# Patient Record
Sex: MALE | Race: WHITE
[De-identification: names, ages, dates, MRNs, and addresses within clinical notes are randomized per-mention and may not be internally consistent; named-entity substitution may affect disease eponyms.]

---

## 2018-02-08 ENCOUNTER — HOSPITAL ENCOUNTER (OUTPATIENT)
Dept: HOSPITAL 76 - SDS | Age: 53
Discharge: HOME | End: 2018-02-08
Attending: SURGERY
Payer: COMMERCIAL

## 2018-02-08 VITALS — SYSTOLIC BLOOD PRESSURE: 125 MMHG | DIASTOLIC BLOOD PRESSURE: 80 MMHG

## 2018-02-08 DIAGNOSIS — Z12.11: Primary | ICD-10-CM

## 2018-02-08 DIAGNOSIS — K64.8: ICD-10-CM

## 2018-02-08 DIAGNOSIS — K64.4: ICD-10-CM

## 2018-02-08 PROCEDURE — 0DJD8ZZ INSPECTION OF LOWER INTESTINAL TRACT, VIA NATURAL OR ARTIFICIAL OPENING ENDOSCOPIC: ICD-10-PCS | Performed by: SURGERY

## 2018-02-08 PROCEDURE — 45378 DIAGNOSTIC COLONOSCOPY: CPT

## 2021-01-21 ENCOUNTER — HOSPITAL ENCOUNTER (OUTPATIENT)
Dept: HOSPITAL 76 - LAB.N | Age: 56
Discharge: HOME | End: 2021-01-21
Attending: FAMILY MEDICINE
Payer: COMMERCIAL

## 2021-01-21 ENCOUNTER — HOSPITAL ENCOUNTER (OUTPATIENT)
Dept: HOSPITAL 76 - DI.N | Age: 56
Discharge: HOME | End: 2021-01-21
Attending: FAMILY MEDICINE
Payer: COMMERCIAL

## 2021-01-21 DIAGNOSIS — I10: Primary | ICD-10-CM

## 2021-01-21 LAB
ALBUMIN DIAFP-MCNC: 4.3 G/DL (ref 3.2–5.5)
ALBUMIN/GLOB SERPL: 1.5 {RATIO} (ref 1–2.2)
ALP SERPL-CCNC: 100 IU/L (ref 42–121)
ALT SERPL W P-5'-P-CCNC: 16 IU/L (ref 10–60)
ANION GAP SERPL CALCULATED.4IONS-SCNC: 5 MMOL/L (ref 6–13)
AST SERPL W P-5'-P-CCNC: 22 IU/L (ref 10–42)
BASOPHILS NFR BLD AUTO: 0 10^3/UL (ref 0–0.1)
BASOPHILS NFR BLD AUTO: 0.7 %
BILIRUB BLD-MCNC: 0.8 MG/DL (ref 0.2–1)
BUN SERPL-MCNC: 15 MG/DL (ref 6–20)
CALCIUM UR-MCNC: 9.4 MG/DL (ref 8.5–10.3)
CHLORIDE SERPL-SCNC: 105 MMOL/L (ref 101–111)
CHOLEST SERPL-MCNC: 212 MG/DL
CO2 SERPL-SCNC: 28 MMOL/L (ref 21–32)
CREAT SERPLBLD-SCNC: 1.1 MG/DL (ref 0.6–1.2)
EOSINOPHIL # BLD AUTO: 0.2 10^3/UL (ref 0–0.7)
EOSINOPHIL NFR BLD AUTO: 2.8 %
ERYTHROCYTE [DISTWIDTH] IN BLOOD BY AUTOMATED COUNT: 11.9 % (ref 12–15)
GLOBULIN SER-MCNC: 2.9 G/DL (ref 2.1–4.2)
GLUCOSE SERPL-MCNC: 101 MG/DL (ref 70–100)
HDLC SERPL-MCNC: 41 MG/DL
HDLC SERPL: 5.2 {RATIO} (ref ?–5)
HGB UR QL STRIP: 15.5 G/DL (ref 14–18)
LDLC SERPL CALC-MCNC: 127 MG/DL
LDLC/HDLC SERPL: 3.1 {RATIO} (ref ?–3.6)
LYMPHOCYTES # SPEC AUTO: 1.9 10^3/UL (ref 1.5–3.5)
LYMPHOCYTES NFR BLD AUTO: 34.4 %
MCH RBC QN AUTO: 29 PG (ref 27–31)
MCHC RBC AUTO-ENTMCNC: 33.3 G/DL (ref 32–36)
MCV RBC AUTO: 87.3 FL (ref 80–94)
MONOCYTES # BLD AUTO: 0.5 10^3/UL (ref 0–1)
MONOCYTES NFR BLD AUTO: 9.4 %
NEUTROPHILS # BLD AUTO: 2.9 10^3/UL (ref 1.5–6.6)
NEUTROPHILS # SNV AUTO: 5.4 X10^3/UL (ref 4.8–10.8)
NEUTROPHILS NFR BLD AUTO: 52.5 %
PDW BLD AUTO: 9.7 FL (ref 7.4–11.4)
PLATELET # BLD: 247 10^3/UL (ref 130–450)
PROT SPEC-MCNC: 7.2 G/DL (ref 6.7–8.2)
RBC MAR: 5.34 10^6/UL (ref 4.7–6.1)
T3FREE SERPL-MCNC: 3.16 PG/ML (ref 2.5–3.9)
T4 FREE SERPL-MCNC: 0.95 NG/DL (ref 0.58–1.64)
TSH SERPL-ACNC: 2.7 UIU/ML (ref 0.34–5.6)
VLDLC SERPL-SCNC: 44 MG/DL

## 2021-01-21 PROCEDURE — 80061 LIPID PANEL: CPT

## 2021-01-21 PROCEDURE — 83721 ASSAY OF BLOOD LIPOPROTEIN: CPT

## 2021-01-21 PROCEDURE — 85025 COMPLETE CBC W/AUTO DIFF WBC: CPT

## 2021-01-21 PROCEDURE — 84481 FREE ASSAY (FT-3): CPT

## 2021-01-21 PROCEDURE — 36415 COLL VENOUS BLD VENIPUNCTURE: CPT

## 2021-01-21 PROCEDURE — 80053 COMPREHEN METABOLIC PANEL: CPT

## 2021-01-21 PROCEDURE — 84443 ASSAY THYROID STIM HORMONE: CPT

## 2021-01-21 PROCEDURE — 84439 ASSAY OF FREE THYROXINE: CPT

## 2021-01-21 NOTE — XRAY REPORT
PROCEDURE:  Chest 2 View X-Ray

 

INDICATIONS:  ESSENTIAL HYPERTENSION

 

TECHNIQUE:  2 view(s) of the chest.  

 

COMPARISON:  None.

 

FINDINGS:  

 

Surgical changes and devices:  None.  

 

Lungs and pleura:  No pleural effusions or pneumothorax.  Lungs are clear.  

 

Mediastinum:  Mediastinal contours are normal.  Heart size is normal.  

 

Bones and chest wall:  No suspicious bony abnormalities.  Soft tissues appear unremarkable.  

 

IMPRESSION:  Chest without acute cardiopulmonary abnormalities.

 

Reviewed by: Nando Joseph MD on 1/21/2021 9:17 AM PST

Approved by: Nando Joseph MD on 1/21/2021 9:17 AM [facility]

 

 

Station ID:  [id number]

## 2021-02-12 ENCOUNTER — HOSPITAL ENCOUNTER (OUTPATIENT)
Dept: HOSPITAL 76 - DI | Age: 56
Discharge: HOME | End: 2021-02-12
Attending: FAMILY MEDICINE
Payer: COMMERCIAL

## 2021-02-12 DIAGNOSIS — I47.1: Primary | ICD-10-CM

## 2021-02-12 DIAGNOSIS — I10: ICD-10-CM

## 2021-02-12 PROCEDURE — 93306 TTE W/DOPPLER COMPLETE: CPT

## 2021-03-17 ENCOUNTER — HOSPITAL ENCOUNTER (OUTPATIENT)
Dept: HOSPITAL 76 - SC | Age: 56
Discharge: HOME | End: 2021-03-17
Attending: NURSE PRACTITIONER
Payer: COMMERCIAL

## 2021-03-17 VITALS — SYSTOLIC BLOOD PRESSURE: 134 MMHG | DIASTOLIC BLOOD PRESSURE: 85 MMHG

## 2021-03-17 DIAGNOSIS — E66.3: ICD-10-CM

## 2021-03-17 DIAGNOSIS — G47.8: Primary | ICD-10-CM

## 2021-03-17 DIAGNOSIS — R51.9: ICD-10-CM

## 2021-03-17 DIAGNOSIS — R06.83: ICD-10-CM

## 2021-03-17 PROCEDURE — 99203 OFFICE O/P NEW LOW 30 MIN: CPT

## 2021-03-17 PROCEDURE — 99212 OFFICE O/P EST SF 10 MIN: CPT

## 2021-03-17 NOTE — SLEEP CARE CONSULTATION
Information from patient questionnaire entered by Crys Doan.





I have reviewed and concur with the information entered by Crys Doan. This 

document represents the service I personally performed and the decisions made by

me, Brianda Abel ARNP.





History of Present Illness


Service Date and Time: 03/17/2021    1426


Reason for Visit: New patient


Chief Complaint: reports: Unrefreshed sleep (especially when waking up and not 

able to go back to sleep due to ruminating thoughts), Snoring (only if on his 

back), Frequent awakenings at night, Other (Cardiologist).  denies: Observed 

pauses in breathing


Usual bedtime: 9:30


Time it takes to fall asleep: 15 min - 30 min


Snores at night: Yes


Sleeps alone due to snoring: No


Number of times waking at night: 1 to 2


Reasons for waking at night: reports: Bathroom


Toss, Turn, or Twitch while sleeping: No


Recalls having dreams: Yes


Usually gets out of bed at: 4:30 AM


Feels refreshed in the morning: Yes (most mornings; sometimes not rested)


Morning headache: Yes (sometimes; 2-3 times a week before BP medications)


Sleepy or fatigued during the day: No


Ever fallen asleep while driving: No


Takes day naps: No


Dreams during day naps: No


Prior sleep studies: No


Additional HPI information: 





I had the pleasure of seeing RUT GRADY today regarding the possibility of him

having a sleep disorder. His current complaints are snoring, unrefreshed sleep 

sometimes and frequent night awakenings. He has been having short durations of 

heart racing with some elevated blood pressure. He saw his PCP who put him on 

blood pressure medication. He was then sent for chest xray, an echo cardiogram 

and also a heart monitor. After the heart monitor, he was then referred here for

evaluation for possible sleep apnea. He states his wife will touch him to turn 

off his back if he is snoring. He does not snore as much on his back. She has 

not told him he has any pauses in breathing while sleeping. He wakes up 

frequently at night. He has also had morning headaches that have improved since 

starting the amlodipine and lisinopril. He has not yet followed up with the 

cardiologist. 








- Parasomnia Symptoms


Ever been unable to move upon waking from sleep: No


Walks in sleep: No


Talks in sleep: No


Ever acted out dreams in sleep: No


Ever felt weak in the knees when startled or emotional: No


Bothered by creepy, crawly, restless sensations in legs: No


Problems with memory or concentration: No





Subjective


Initial [place] Sleepiness Scale score: 6 (in 2021)





Past Medical History


Past Medical History: reports: Hypertension





Social History


The patient's occupation is an . Patient is  and lives in

[place]. 





Have you smoked in the past 12 months: No


Alcohol use: Yes


Alcohol amount and frequency: Very little, 1-2 times monthly


Caffeine use: No


Caffeine amount and frequency: Did not for 1.5 months





Family History


Family history of sleep disordered breathing: No





Allergies and Home Medications


Drug allergies reviewed: Yes (NKDA)


Home medication list reviewed: Yes


Allergy and home medication list: 





Lisinopril


Amlodipine





Review of Systems


Cardiovascular: reports: high blood pressure


Gastrointestinal: denies: heartburn


Neurological: reports: headaches


Psychiatric: denies: anxiety, depression


Ear/Nose/Throat: reports: other (nose runs a lot).  denies: sinus problems, dry 

mouth/throat, tonsillectomy, wisdom teeth removed


Musculoskeletal: reports: back pain (sometimes)


Immunologic: reports: sneezing (runny nose)





Physical Exam


Blood Pressure: 134/85


Cuff size: wrist


Heart Rate: 87


O2 Saturation: 98


Height: 6 ft


Weight: 207 lb


Body Mass Index: 28.0


BMI Classification: Overweight


Neck circumference: 16.75 (inches)


Nostrils: patent to airflow


Mouth and throat: narrow oropharynx


Soft palate: long


Hard palate: normal


Uvula: normal


Uvula visualization: 100% Mallampati Class I


Tongue: normal in size


Tonsils: 1+


Neck: normal w/o lymphadenopathy or thyromegaly


Heart: regular rate and rhythm


Lungs: clear bilaterally





Impression and Plan





1. Suspected Obstructive Sleep Apnea-Hypopnea Syndrome, as suggested by a 

history of loud and irregular snoring, morning headache, frequent awakening 

during the night, and unrefreshed sleep. Narrow oropharynx and obesity are 

common predisposing factors for obstructive sleep apnea-hypopnea syndrome. I 

recommend proceeding to polysomnography to confirm the diagnosis and to assess 

severity. If the patient has significant sleep disordered breathing, a manual 

CPAP titration study will also be performed to find the optimal treatment 

pressure. I informed the patient of what the sleep studies involve and after 

some discussion, obtained agreement to proceed. The pathophysiology of 

obstructive sleep apnea-hypopnea syndrome was discussed with the patient and 

health risks of cardiovascular and cerebrovascular disease if not treated. Risks

of drowsy driving discussed in detail and patient advised to avoid long distance

driving and to pull over at the first sign of drowsiness. Patient agreed to 

plan. 





* Schedule polysomnography +- manual CPAP titration study and return in 1-2 

  weeks after the study to discuss result and initiate therapy.


* Avoid long distance driving or driving when feeling sleepy.


* Avoid alcohol, sedative and muscle relaxant around bedtime.


* Attempt to lose weight.


* Review instructions provided by trained office staff on how to prepare for the

  sleep study.


* Return for follow-up after sleep study completed.








Counseling Topics: Weight loss health impact


Visit Type: In Office


Time Spent with Patient (minutes): 33


Provider Statement: I spent 100% of the Face to Face Visit with the patient with

greater than 50% spent counseling the patient and coordination of care.

## 2021-03-25 ENCOUNTER — HOSPITAL ENCOUNTER (OUTPATIENT)
Dept: HOSPITAL 76 - SC | Age: 56
Discharge: HOME | End: 2021-03-25
Attending: NURSE PRACTITIONER
Payer: COMMERCIAL

## 2021-03-25 DIAGNOSIS — E66.3: ICD-10-CM

## 2021-03-25 DIAGNOSIS — G47.33: Primary | ICD-10-CM

## 2021-03-25 PROCEDURE — 95806 SLEEP STUDY UNATT&RESP EFFT: CPT

## 2021-03-30 ENCOUNTER — HOSPITAL ENCOUNTER (OUTPATIENT)
Dept: HOSPITAL 76 - SC | Age: 56
Discharge: HOME | End: 2021-03-30
Attending: NURSE PRACTITIONER
Payer: COMMERCIAL

## 2021-03-30 DIAGNOSIS — E66.3: ICD-10-CM

## 2021-03-30 DIAGNOSIS — G47.33: Primary | ICD-10-CM

## 2021-03-30 PROCEDURE — 99212 OFFICE O/P EST SF 10 MIN: CPT

## 2021-03-30 PROCEDURE — 99213 OFFICE O/P EST LOW 20 MIN: CPT

## 2021-03-30 NOTE — SLEEP CARE CONSULTATION
Information from patient questionnaire entered by Crys Doan.





I have reviewed and concur with the information entered by Crys Doan. This 

document represents the service I personally performed and the decisions made by

me, Brianda Abel ARNP.





History of Present Illness


Service Date and Time: 03/30/2021    1555


Initial [place] Sleepiness Scale score: 6 (in 2021)


Current [place] Sleepiness Scale score: 6


Additional HPI information: 





RUT GRADY returns for follow up and results of the recently performed home 

sleep study.





I explained the pathophysiology behind obstructive sleep apnea. We then spent 

quite a bit of time discussing different treatment options.  For mild 

obstructive sleep apnea, surgery and oral appliance are alternatives to nasal 

CPAP therapy but in moderate or severe cases, nasal CPAP is the most effective 

and reliable treatment.    





I explained how CPAP machine works with sample devices RespirPivotal Systemss Dreamstation 

and ResMed [id number] and what to expect when using the machine.  [facility] patient 

education PAP tips and Non Pap treatment pamphlets reviewed and given to 

patient. Patient counseled not drink alcohol less than 4 hours before bedtime as

it can increase snoring and apnea.  Patient was cautioned about risks of drowsy 

driving until sleepiness symptoms resolve. 





Sleep Study





- Results


Type of Sleep Study: Home sleep study


Prior sleep studies: No


Polysomnography/Home Sleep Study results: 





Physician Impression:


The quality of the study is good. The length of the study is adequate (> 240 

minutes). Please also see the


tabulated and graphic data.





1. Obstructive Sleep Apnea-Hypopnea (ICD-10 G47.33), mild, with an AHI of 6.5/hr

and wayne SaO2


of 84%. During the study, the patient had 27 apneas (27 obstructive, 0 central, 

0 mixed) and 19


hypopneas. The longest episode lasted 67.5 seconds. The respiratory events 

occurred independently of


body position (supine AHI was 6.3 and non-supine, 8.22).


2. Hypoxemia (ICD-10 R09.02), minimal, with the lowest oxygen saturation of 84 %

and 0.9 minutes


with SaO2 under 90%. Baseline oxygen saturation was normal (Average oxygen 

saturation was 94%).





Allergies and Home Medications


Home medication list reviewed: Yes (no changes)





Review of Systems


Review of systems same as previous: Yes (no changes)





Physical Exam


Heart Rate: 69


O2 Saturation: 98


Height: 6 ft


Weight: 208 lb


Body Mass Index: 28.2


BMI Classification: Overweight





Impression and Plan





1. Obstructive Sleep Apnea-Hypopnea Syndrome, mild, with lowest oxygen 

saturation of 84%. Positive pressure therapy could benefit hypertension. After 

discussing options for treatment, uRt voiced that he would like to ask his new

cardiologist that he meets with on Thursday about recommendations. He may try 

the oral appliance or the CPAP therapy to control his apneas. He was given [facility] 

brochures to review on different treatments and will call our office with his 

choice of treatment. He was provided a copy of the list of certified dentist in 

the area for an oral appliance if he so decides to use an oral appliance. He 

will followup 1 month after starting oral appliance or CPAP therapy. He voiced 

understanding and agreement to plan. 





* Patient to let us know which therapy he would like to try after his cardiology

  appointment.


* Try to lose weight.


* Avoid alcohol consumption near bedtime.


* The patient is again cautioned about driving until sleepiness completely 

  resolves.


* Return one month after CPAP or oral appliance obtained. I will assess response

  to therapy and compliance at that time.





Counseling Topics: Weight loss health impact


Visit Type: In Office


Time Spent with Patient (minutes): 25


Provider Statement: I spent 100% of the Face to Face Visit with the patient with

greater than 50% spent counseling the patient and coordination of care.

## 2021-04-02 ENCOUNTER — HOSPITAL ENCOUNTER (OUTPATIENT)
Dept: HOSPITAL 76 - LAB.N | Age: 56
Discharge: HOME | End: 2021-04-02
Attending: PHYSICIAN ASSISTANT
Payer: COMMERCIAL

## 2021-04-02 DIAGNOSIS — I45.5: Primary | ICD-10-CM

## 2021-04-02 LAB
ALBUMIN DIAFP-MCNC: 4.1 G/DL (ref 3.2–5.5)
ALBUMIN/GLOB SERPL: 1.3 {RATIO} (ref 1–2.2)
ALP SERPL-CCNC: 100 IU/L (ref 42–121)
ALT SERPL W P-5'-P-CCNC: 13 IU/L (ref 10–60)
ANION GAP SERPL CALCULATED.4IONS-SCNC: 9 MMOL/L (ref 6–13)
AST SERPL W P-5'-P-CCNC: 21 IU/L (ref 10–42)
BILIRUB BLD-MCNC: 0.8 MG/DL (ref 0.2–1)
BUN SERPL-MCNC: 14 MG/DL (ref 6–20)
CALCIUM UR-MCNC: 9.4 MG/DL (ref 8.5–10.3)
CHLORIDE SERPL-SCNC: 102 MMOL/L (ref 101–111)
CO2 SERPL-SCNC: 27 MMOL/L (ref 21–32)
CREAT SERPLBLD-SCNC: 1.1 MG/DL (ref 0.6–1.2)
GFRSERPLBLD MDRD-ARVRAT: 69 ML/MIN/{1.73_M2} (ref 89–?)
GLOBULIN SER-MCNC: 3.1 G/DL (ref 2.1–4.2)
GLUCOSE SERPL-MCNC: 101 MG/DL (ref 70–100)
POTASSIUM SERPL-SCNC: 4 MMOL/L (ref 3.5–5)
PROT SPEC-MCNC: 7.2 G/DL (ref 6.7–8.2)
SODIUM SERPLBLD-SCNC: 138 MMOL/L (ref 135–145)
T4 FREE SERPL-MCNC: 0.92 NG/DL (ref 0.58–1.64)
TSH SERPL-ACNC: 2.69 UIU/ML (ref 0.34–5.6)

## 2021-04-02 PROCEDURE — 36415 COLL VENOUS BLD VENIPUNCTURE: CPT

## 2021-04-02 PROCEDURE — 84443 ASSAY THYROID STIM HORMONE: CPT

## 2021-04-02 PROCEDURE — 84439 ASSAY OF FREE THYROXINE: CPT

## 2021-04-02 PROCEDURE — 80053 COMPREHEN METABOLIC PANEL: CPT

## 2021-06-04 ENCOUNTER — HOSPITAL ENCOUNTER (OUTPATIENT)
Dept: HOSPITAL 76 - SC | Age: 56
Discharge: HOME | End: 2021-06-04
Attending: NURSE PRACTITIONER
Payer: COMMERCIAL

## 2021-06-04 DIAGNOSIS — G47.33: Primary | ICD-10-CM

## 2021-06-04 DIAGNOSIS — E66.3: ICD-10-CM

## 2021-06-04 PROCEDURE — 99212 OFFICE O/P EST SF 10 MIN: CPT

## 2021-06-04 NOTE — SLEEP CARE CONSULTATION
Information from patient questionnaire entered by Crys Doan.





I have reviewed and concur with the information entered by Crys Doan. This 

document represents the service I personally performed and the decisions made by

me, Brianda Abel ARNP.





History of Present Illness


Service Date and Time: 06/04/2021    1645


Previous diagnosis: Mild, Obstructive Sleep Apnea-Hypopnea Syndrome


AHI: 6.5


Reason for follow up: first compliance


Equipment type: CPAP


Equipment obtained from: Other ([facility]; got initial supplies)


Mask style: Nasal pillows


Mask brand: Respironics (size medium)


Backup mask available: No (will keep old mask when replaced)


Last cushion change: 1.5 weeks


Prior sleep studies: No


Year and Where: 2021 [facility]


Type of Sleep Study: Home sleep study


HPI additional information: 





RUT GRADY was diagnosed to have mild, AHI 6.5, obstructive sleep apnea-

hypopnea syndrome and returned today for CPAP therapy first compliance follow-

up.





CPAP Compliance Data





- Data Reviewed with Patient


Average duration of nightly device use: 7 h 4 min


Compliance rate %: 90


Current pressure setting (cmH2O): 4-15 (median 4.8, avg 7.1, max 8.3)


Average residual AHI: 0.3


Central apnea: 0.1


Obstructive apnea: 0.1





Subjective


Missed days of use due to: reports: travel


Patient concerns: denies: aerophagia, mask discomfort, air blowing in eyes, mask

leak noise, condensation in mask/hose, nasal congestion, dry mouth, nose, 

throat, epistaxis, other


Observed to snore while using device: No


Current pressure setting perceived as: comfortable


On therapy, patient: reports: other (He states he does not feel much difference,

it all about the same).  denies: sleeping better, awakening more refreshed, 

being more awake and alert during the day, more rested overall, drowsiness while

driving


Initial [place] Sleepiness Scale score: 6 (in 2021)


Current [place] [employer] Sleepiness Scale score: 9





Allergies and Home Medications


Home medication list reviewed: Yes (no new meds)





Review of Systems


Review of systems same as previous: Yes (no changes)





Physical Exam


Heart Rate: 75


O2 Saturation: 98


Height: 6 ft


Weight: 207 lb


Body Mass Index: 28.0


BMI Classification: Overweight





Impression and Plan





1. Obstructive Sleep Apnea-Hypopnea Syndrome, mild, with good treatment 

compliance and good apnea control. On CPAP therapy, the patient states he does 

not really notice a difference of feeling more rested or amount of times 

awakening at night but he is satisfied so far with his treatment. He increased 

his humidity setting when he had some dry mouth. He states it is still a little 

dry and I advised him to increase the setting by 1 to resolve. Oral dryness can 

be reduced by adjusting humidity setting higher or heated hose lower or by 

adjusting both settings. Verbal instructions given on how to change humidity and

heated hose settings with rationale explaining why to change. Patient advised 

that chronic oral dryness can affect dental health. He voiced understanding. 

Patient's apnea severity and rationale for treatment to reduce apnea, improve 

sleep quality and reduce cardiovascular and cerebrovascular events was reviewed.

I also reviewed the benefit of consistent device use of CPAP for hypertension.





* Change auto CPAP pressure to 4-8  cmH2O


* Notify me if snoring with mask or feeling that the pressure is too much or too

  little


* Attempt to lose weight


* Call this office if any problems using CPAP


* Return for follow up in 1-2 months, or sooner if concerns arise





Counseling Topics: Spare mask, Weight loss health impact


Visit Type: In Office


Time Spent with Patient (minutes): 19


Provider Statement: I spent 100% of the Face to Face Visit with the patient with

greater than 50% spent counseling the patient and coordination of care.

## 2021-07-14 ENCOUNTER — HOSPITAL ENCOUNTER (OUTPATIENT)
Dept: HOSPITAL 76 - SC | Age: 56
Discharge: HOME | End: 2021-07-14
Attending: NURSE PRACTITIONER
Payer: COMMERCIAL

## 2021-07-14 DIAGNOSIS — G47.33: Primary | ICD-10-CM

## 2021-07-14 PROCEDURE — 99212 OFFICE O/P EST SF 10 MIN: CPT

## 2021-07-14 NOTE — SLEEP CARE CONSULTATION
Information from patient questionnaire entered by Crys Doan.





I have reviewed and concur with the information entered by Crys Doan. This 

document represents the service I personally performed and the decisions made by

me, Brianda Abel ARNP.





History of Present Illness


Service Date and Time: 07/14/2021    1620


Previous diagnosis: Mild, Obstructive Sleep Apnea-Hypopnea Syndrome


AHI: 6.5


Reason for follow up: other (6-week f/u - pressure change)


Equipment type: CPAP


Equipment obtained from: Other ([facility]; no new supplies yet)


Mask style: Nasal pillows


Mask brand: Respironics


Backup mask available: No (will keep old mask when replaced)


Last cushion change: 1.5 weeks ago


Prior sleep studies: No


Year and Where: 2021 [facility]


Type of Sleep Study: Home sleep study


HPI additional information: 





RUT GRADY was diagnosed to have mild, AHI 6.5, obstructive sleep apnea-

hypopnea syndrome and returned today for CPAP therapy 6 week pressure change fol

low-up.





CPAP Compliance Data





- Data Reviewed with Patient


Average duration of nightly device use: 7 h 10 min


Compliance rate %: 93


Current pressure setting (cmH2O): 4-8


Average residual AHI: 0.2


Central apnea: 0.0


Obstructive apnea: 0.0


Average large leak: 3.5 L/min





Subjective


Missed days of use due to: reports: travel


Patient concerns: denies: aerophagia, mask discomfort, air blowing in eyes, mask

leak noise, condensation in mask/hose, nasal congestion, dry mouth, nose, 

throat, epistaxis, other


Observed to snore while using device: No


Current pressure setting perceived as: comfortable


On therapy, patient: reports: other (He feels that he sleeps about the same but 

wakes up less often).  denies: drowsiness while driving


Initial [place] Sleepiness Scale score: 6 (in 2021)


Current [place] Sleepiness Scale score: 4





Allergies and Home Medications


Home medication list reviewed: Yes (no new meds)





Review of Systems


Review of systems same as previous: Yes (no changes)





Physical Exam


Heart Rate: 68


O2 Saturation: 97


Height: 6 ft


Weight: 213 lb


Body Mass Index: 28.8


BMI Classification: Overweight





Impression and Plan





1. Obstructive Sleep Apnea-Hypopnea Syndrome, mild, with good treatment 

compliance and excellent apnea control. On CPAP therapy, the patient he has not 

noticed a difference in his sleep quality or of feeling more rested. He states 

he originally came because of his heart as referred by his cardiologist.  He has

noticed that he does not get up at night as often to go to the bathroom.  His 

[place] is 4/24, down from 6/24 at his initial visit. Patient has no issues or 

problems with CPAP mask use.  He states he needs to contact his [employer] company to 

get some supplies as he is nearly out of masks.  I encouraged him to contact 

them and find out about getting replacement supplies. I asked patient about 

plans for weight loss and he states he and his wife have try to start walking 

more to increase their exercise.  I also advised him to eat healthy foods like 

fruits, vegetables and whole grain and to avoid processed and packaged foods.  

He voiced understanding. Patient's apnea severity and rationale for treatment to

reduce apnea, improve sleep quality and reduce cardiovascular and 

cerebrovascular events was reviewed. I also reviewed the benefit of consistent 

device use of CPAP for hypertension.





* Continue autoCPAP pressure at 4-8 cmH2O


* Notify me if snoring with mask or feeling that the pressure is too much or too

  little


* Attempt to lose weight


* Call this office if any problems using CPAP


* Return for follow up in 3 months, or sooner if concerns arise





Counseling Topics: Spare mask, Weight loss health impact, Activity level


Visit Type: In Office


Time Spent with Patient (minutes): 17


Provider Statement: I spent 100% of the Face to Face Visit with the patient with

greater than 50% spent counseling the patient and coordination of care.

## 2021-10-13 ENCOUNTER — HOSPITAL ENCOUNTER (OUTPATIENT)
Dept: HOSPITAL 76 - SC | Age: 56
Discharge: HOME | End: 2021-10-13
Attending: NURSE PRACTITIONER
Payer: COMMERCIAL

## 2021-10-13 DIAGNOSIS — G47.33: Primary | ICD-10-CM

## 2021-10-13 DIAGNOSIS — E66.3: ICD-10-CM

## 2021-10-13 PROCEDURE — 99212 OFFICE O/P EST SF 10 MIN: CPT

## 2021-10-13 NOTE — SLEEP CARE CONSULTATION
Information from patient questionnaire entered by Crys Doan.





I have reviewed and concur with the information entered by Crys Doan. This 

document represents the service I personally performed and the decisions made by

me, Brianda Abel ARNP.





History of Present Illness


Service Date and Time: 10/13/2021    1634


Previous diagnosis: Mild, Obstructive Sleep Apnea-Hypopnea Syndrome


AHI: 6.5


Reason for follow up: other (6-week followup)


Equipment type: CPAP


Equipment obtained from: Other ([facility]; getting supplies as 

needed)


Mask style: Nasal pillows


Backup mask available: Yes (old mask)


Last cushion change: almost 2 weeks


Prior sleep studies: No


Year and Where: 2021 [facility]


Type of Sleep Study: Home sleep study


HPI additional information: 





RUT GRADY was diagnosed to have mild, AHI 6.5, obstructive sleep apnea-

hypopnea syndrome and returned today for CPAP therapy three month follow-up.





CPAP Compliance Data





- Data Reviewed with Patient


Average duration of nightly device use: 7 h 6 min


Compliance rate %: 97


Current pressure setting (cmH2O): 4-8


Average residual AHI: 0.2


Central apnea: 0.0


Obstructive apnea: 0.0





Subjective


Missed days of use due to: reports: travel


Patient concerns: reports: nasal congestion (?).  denies: aerophagia, mask 

discomfort, air blowing in eyes, mask leak noise, condensation in mask/hose, dry

mouth, nose, throat, epistaxis, other


Observed to snore while using device: No


Current pressure setting perceived as: comfortable


On therapy, patient: reports: other (Has not felt more rested or sleeping 

better; doing for benefits for heart).  denies: drowsiness while driving


Initial [place] Sleepiness Scale score: 6 (in 2021)


Current [place] Sleepiness Scale score: 7





Allergies and Home Medications


Home medication list reviewed: Yes (no changes)





Review of Systems


Review of systems same as previous: Yes (no changes)





Physical Exam


Heart Rate: 79


O2 Saturation: 98


Height: 6 ft


Weight: 213 lb


Body Mass Index: 28.8


BMI Classification: Overweight





Impression and Plan





1. Obstructive Sleep Apnea-Hypopnea Syndrome, mild, with good treatment 

compliance and excellent apnea control. On CPAP therapy, the patient has better 

sleep quality and is more rested overall. Patient states that since starting the

CPAP he gets some post nasal drip nightly. He is concerned that this is nasal 

congestion. Nasal congestion can be reduced with increasing the CPAP humidity. 

The heated hose can be  adjusted higher if condensation with higher humidity 

setting. Patient encouraged to try to adjust humidity to see if this will help 

reduce nasal drainage. It can be a normal reaction to the CPAP pressure. Patient

voiced understanding. Patient has not lost or gained any weight since his last 

visit. Patient was encouraged to lose weight for their overall health and to 

reduce apneas. Patient's apnea severity and rationale for treatment to reduce 

apnea, improve sleep quality and reduce cardiovascular and cerebrovascular 

events was reviewed. I also reviewed the benefit of consistent device use of 

CPAP for hypertension.





* Continue auto CPAP pressure at 4-8 cmH2O


* Notify me if snoring with mask or feeling that the pressure is too much or too

  little


* Attempt to lose weight


* Call this office if any problems using CPAP


* Return for follow up in 6 months, or sooner if concerns arise





Counseling Topics: Spare mask, Weight loss health impact


Visit Type: In Office


Time Spent with Patient (minutes): 16


Provider Statement: I spent 100% of the Face to Face Visit with the patient with

greater than 50% spent counseling the patient and coordination of care.

## 2022-05-12 ENCOUNTER — HOSPITAL ENCOUNTER (OUTPATIENT)
Dept: HOSPITAL 76 - SC | Age: 57
Discharge: HOME | End: 2022-05-12
Attending: NURSE PRACTITIONER
Payer: COMMERCIAL

## 2022-05-12 VITALS — SYSTOLIC BLOOD PRESSURE: 133 MMHG | DIASTOLIC BLOOD PRESSURE: 77 MMHG

## 2022-05-12 DIAGNOSIS — G47.33: Primary | ICD-10-CM

## 2022-05-12 DIAGNOSIS — E66.3: ICD-10-CM

## 2022-05-12 PROCEDURE — 99212 OFFICE O/P EST SF 10 MIN: CPT

## 2022-05-12 NOTE — SLEEP CARE CONSULTATION
Information from patient questionnaire entered by Sid Rolon MA.





I have reviewed and concur with the information entered by Sid Rolon MA. 

This document represents the service I personally performed and the decisions 

made by me, Brianda Abel ARNP.





History of Present Illness


Service Date and Time: 05/12/2022    1610


Previous diagnosis: Mild, Obstructive Sleep Apnea-Hypopnea Syndrome


AHI: 6.5


Reason for follow up: six month (LAST SEEN 10/13/2021, RESMED, )


Equipment type: CPAP


Equipment obtained from: Other ([facility]; getting supplies as 

needed)


Mask style: Nasal pillows


Backup mask available: Yes (old mask)


Last cushion change: 12 days ago


Prior sleep studies: No


Year and Where: 2021 [facility]


Type of Sleep Study: Home sleep study


HPI additional information: 





RUT GRADY was diagnosed to have mild, AHI 6.5, obstructive sleep apnea-

hypopnea syndrome and returned today for CPAP therapy six month follow-up.





Sleep Study





- Results


Type of Sleep Study: Home sleep study


Prior sleep studies: No


Year and Where: 2021 [facility]





CPAP Compliance Data





- Data Reviewed with Patient


Average duration of nightly device use: 6 HOURS 53 MINUTES


Compliance rate %: 93


Current pressure setting (cmH2O): 4-8


Average residual AHI: 0.9


Central apnea: 0


Obstructive apnea: .1


Average large leak: 2.2 LPM


Compliance data discussion: 





ResMed Airsense 10 Autoset








Subjective


Missed days of use due to: reports: travel (for work)


Patient concerns: denies: aerophagia, mask discomfort, air blowing in eyes, mask

leak noise, condensation in mask/hose, nasal congestion, dry mouth, nose, 

throat, epistaxis, other


Observed to snore while using device: No


Current pressure setting perceived as: comfortable


On therapy, patient: reports: sleeping better, awakening more refreshed, being 

more awake and alert during the day, more rested overall.  denies: drowsiness 

while driving


Initial [place] Sleepiness Scale score: 6 (in 2021)


Current [place] Sleepiness Scale score: 9 (5/2022)





Allergies and Home Medications


Home medication list reviewed: Yes (no changes)


Allergy and home medication list: 


Allergies





No Known Drug Allergies Allergy (Verified 02/07/18 14:49)


   





Review of Systems


Review of systems same as previous: Yes (no changes)





Physical Exam


Vital signs obtained and entered by: DINA REGALADO


Blood Pressure: 133/77 (RIGHT, RESP 16, PULSE 65,)


Heart Rate: 63


O2 Saturation: 99 (PAPER MASK)


Height: 6 ft


Weight: 208 lb (CLOTHES AND SHOES)


Body Mass Index: 28.2


BMI Classification: Overweight





Impression and Plan





1. Obstructive Sleep Apnea-Hypopnea Syndrome, mild, with good treatment 

compliance and excellent apnea control. On CPAP therapy, the patient has better 

sleep quality and is more rested overall with no changes. Patient denies 

problems with oral dryness, nasal congestion, epistaxis, skin irritation or 

aerophagia. Patient's apnea severity and rationale for treatment to reduce 

apnea, improve sleep quality and reduce cardiovascular and cerebrovascular 

events was reviewed. I also reviewed the benefit of consistent device use of C

PAP for hypertension. Patient was encouraged to try to lose weight, current BMI 

at 28.2.





* Continue auto CPAP pressure at 4-8 cmH2O


* Notify me if snoring with mask or feeling that the pressure is too much or too

  little


* Attempt to lose weight


* Call this office if any problems using CPAP


* Return for follow up in 1 year, or sooner if concerns arise





Counseling Topics: Spare mask, Weight loss health impact


Visit Type: In Office


Time Spent with Patient (minutes): 13


Provider Statement: I spent 100% of the Face to Face Visit with the patient with

greater than 50% spent counseling the patient and coordination of care.

## 2022-12-22 ENCOUNTER — HOSPITAL ENCOUNTER (OUTPATIENT)
Dept: HOSPITAL 76 - DI.N | Age: 57
Discharge: HOME | End: 2022-12-22
Attending: FAMILY MEDICINE
Payer: COMMERCIAL

## 2022-12-22 DIAGNOSIS — M47.812: Primary | ICD-10-CM

## 2022-12-22 NOTE — XRAY REPORT
PROCEDURE:  Cervical Spine Comp w/Flex/Ext

 

INDICATIONS:  STRAIN OF MUSCLE AND TENDON OF NECK

 

TECHNIQUE:  7 views of the cervical spine were acquired.  

 

COMPARISON: None.

 

FINDINGS:  

 

Bones:  No fractures or dislocations to the T1 level.  No suspicious bony lesions.  There is normal r
vicky of motion between flexion and extension, with preserved normal bony alignment. There is probably
 congenital fusion of C2-C3 anteriorly and posteriorly. Cervical spondylitic changes centered at C5-C
6 and C6-C7. There is disc height loss and uncovertebral joint hypertrophy at both of these levels. T
here is right bony foraminal narrowing at C5-C6 and C6-C7. There is left bony foraminal narrowing at 
C4-C5, C5-C6, and C6-C7.

 

Soft tissues:  Prevertebral soft tissues are normal in thickness.  

 

IMPRESSION:  

 

1. 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

 

2. No abnormal motion on flexion and extension.

 

3. Cervical spondylosis with multilevel foraminal narrowing.

 

Comment: Cervical spine MRI may be helpful.

 

Reviewed by: Law Ac MD on 12/22/2022 4:19 PM PST

Approved by: Law Ac MD on 12/22/2022 4:19 PM PST

 

 

Station ID:  [id number]

## 2023-05-18 ENCOUNTER — HOSPITAL ENCOUNTER (OUTPATIENT)
Dept: HOSPITAL 76 - SC | Age: 58
Discharge: HOME | End: 2023-05-18
Attending: NURSE PRACTITIONER
Payer: COMMERCIAL

## 2023-05-18 VITALS — DIASTOLIC BLOOD PRESSURE: 76 MMHG | SYSTOLIC BLOOD PRESSURE: 128 MMHG

## 2023-05-18 DIAGNOSIS — E66.3: ICD-10-CM

## 2023-05-18 DIAGNOSIS — G47.33: Primary | ICD-10-CM

## 2023-05-18 PROCEDURE — 99212 OFFICE O/P EST SF 10 MIN: CPT

## 2023-05-18 NOTE — SLEEP PATIENT INSTRUCTIONS
Sleep Center Visit Summary





- Patient Visit Information


Reason for Visit: 





Annual visit for CPAP therapy followup





- Patient Instructions


Additional Instructions: 








You will continue with CPAP therapy with pressure set at 4-8 cmH2O.





A supply prescription will be updated with your DME.





We encourage you to continue to try to lose weight. 





Please follow up with the sleep care office in 1 year.








- Clinic Information


Contact: 





[facility]


[address] [place] [address]


[email or web address]


T: [phone]

## 2023-05-18 NOTE — SLEEP CARE CONSULTATION
Information from patient questionnaire entered by Ariella Junior.





I have reviewed and concur with the information entered by Ariella Junior. This 

document represents the service I personally performed and the decisions made by

me, Brianda Abel ARNP.





History of Present Illness


Service Date and Time: 05/18/2023    1553


Previous diagnosis: Mild, Obstructive Sleep Apnea-Hypopnea Syndrome


AHI: 6.5


Reason for follow up: annual (LAST SEEN 05/2022)


Equipment type: CPAP (AirSense 10, s/u 7/2021)


Equipment obtained from: Other ([facility]; getting supplies as 

needed)


Mask style: Nasal pillows


Mask brand: Respironics (Dreamwear)


Backup mask available: Yes (old mask)


Last cushion change: 6 weeks ago


Prior sleep studies: No


Year and Where: 2021 [facility]


Type of Sleep Study: Home sleep study


HPI additional information: 





RUT GRADY was diagnosed to have mild, AHI 6.5, obstructive sleep apnea-

hypopnea syndrome and returned today for CPAP therapy annual follow-up.





Sleep Study





- Results


Type of Sleep Study: Home sleep study


Prior sleep studies: No


Year and Where: 2021 [facility]





CPAP Compliance Data





- Data Reviewed with Patient


Average duration of nightly device use: 6 HRS 42 MIN


Compliance rate %: 96 (11/18/22-05/16/23; 174/180 days used)


Current pressure setting (cmH2O): 4-8


Average residual AHI: 0.7


Central apnea: 0


Obstructive apnea: 0.1


Average large leak: 0 lpm





Subjective


Missed days of use due to: reports: travel (for work conference)


Patient concerns: denies: aerophagia, mask discomfort, air blowing in eyes, mask

leak noise, condensation in mask/hose, nasal congestion, dry mouth, nose, 

throat, epistaxis


Observed to snore while using device: No


Current pressure setting perceived as: comfortable


On therapy, patient: reports: sleeping better, awakening more refreshed, being 

more awake and alert during the day, more rested overall.  denies: drowsiness 

while driving


Initial [place] Sleepiness Scale score: 6 (in 2021)


Current [place] Sleepiness Scale score: 4 (05/18/23)





Allergies and Home Medications


Known drug allergies: No


Drug allergies reviewed: Yes


Home medication list reviewed: Yes (no changes)


Allergy and home medication list: 


Allergies





No Known Drug Allergies Allergy (Verified 05/17/23 15:48)








Review of Systems


Review of systems same as previous: No (moles removed, no issues)





Physical Exam


Vital signs obtained and entered by: ARIELLA GHOSH MA


Blood Pressure: 128/76 (LEFT ARM)


Cuff size: regular


Heart Rate: 81


O2 Saturation: 98


Height: 6 ft


Weight: 217 lb 3.2 oz


Weight change since last visit: 9 lb gain


Body Mass Index: 29.4


BMI Classification: Overweight





Impression and Plan





1. Obstructive Sleep Apnea-Hypopnea Syndrome, mild, with good treatment 

compliance and good apnea control. On CPAP therapy, the patient has better sleep

quality and is more rested overall. Patient has significant improvement of their

sleep apnea and is satisfied with current CPAP therapy.  Patient denies problems

with oral dryness, nasal congestion, epistaxis, skin irritation or aerophagia. 

Patient's apnea severity and rationale for treatment to reduce apnea, improve 

sleep quality and reduce cardiovascular and cerebrovascular events was reviewed.

I also reviewed the benefit of consistent device use of CPAP for hypertension.





2. Obesity, unspecified. Currently patients BMI is 29.4.  Obesity increases the

risk of apnea, CPAP pressure requirements and overall health risks especially 

cardiovascular and diabetes. Thus patient is advised to lose weight. 





* Continue auto CPAP pressure at 4-8 cmH2O


* Update supplies


* Notify me if snoring with mask or feeling that the pressure is too much or too

  little


* Attempt to lose weight


* Call this office if any problems using CPAP


* Return for follow up in 1 year, or sooner if concerns arise





Counseling Topics: Spare mask, Weight loss health impact


Visit Type: In Office


Time Spent with Patient (minutes): 11


Provider Statement: I spent 100% of the Face to Face Visit with the patient with

greater than 50% spent counseling the patient and coordination of care.

## 2023-11-21 ENCOUNTER — HOSPITAL ENCOUNTER (OUTPATIENT)
Dept: HOSPITAL 76 - LAB.N | Age: 58
Discharge: HOME | End: 2023-11-21
Attending: FAMILY MEDICINE
Payer: COMMERCIAL

## 2023-11-21 DIAGNOSIS — M54.2: ICD-10-CM

## 2023-11-21 DIAGNOSIS — Z12.5: ICD-10-CM

## 2023-11-21 DIAGNOSIS — I10: Primary | ICD-10-CM

## 2023-11-21 DIAGNOSIS — G47.33: ICD-10-CM

## 2023-11-21 DIAGNOSIS — F52.21: ICD-10-CM

## 2023-11-21 DIAGNOSIS — E78.00: ICD-10-CM

## 2023-11-21 LAB
ALBUMIN DIAFP-MCNC: 4.3 G/DL (ref 3.2–5.5)
ALBUMIN/GLOB SERPL: 1.9 {RATIO} (ref 1–2.2)
ALP SERPL-CCNC: 106 IU/L (ref 42–121)
ALT SERPL W P-5'-P-CCNC: 11 IU/L (ref 10–60)
ANION GAP SERPL CALCULATED.4IONS-SCNC: 3 MMOL/L (ref 6–13)
AST SERPL W P-5'-P-CCNC: 17 IU/L (ref 10–42)
BASOPHILS NFR BLD AUTO: 0 10^3/UL (ref 0–0.1)
BASOPHILS NFR BLD AUTO: 0.8 %
BILIRUB BLD-MCNC: 0.3 MG/DL (ref 0.2–1)
BUN SERPL-MCNC: 11 MG/DL (ref 6–20)
CALCIUM UR-MCNC: 9.6 MG/DL (ref 8.5–10.3)
CHLORIDE SERPL-SCNC: 106 MMOL/L (ref 101–111)
CHOLEST SERPL-MCNC: 208 MG/DL
CO2 SERPL-SCNC: 31 MMOL/L (ref 21–32)
CREAT SERPLBLD-SCNC: 1.1 MG/DL (ref 0.6–1.3)
EOSINOPHIL # BLD AUTO: 0.2 10^3/UL (ref 0–0.7)
EOSINOPHIL NFR BLD AUTO: 3.6 %
ERYTHROCYTE [DISTWIDTH] IN BLOOD BY AUTOMATED COUNT: 12.4 % (ref 12–15)
GFRSERPLBLD MDRD-ARVRAT: 68 ML/MIN/{1.73_M2} (ref 89–?)
GLOBULIN SER-MCNC: 2.3 G/DL (ref 2.1–4.2)
GLUCOSE SERPL-MCNC: 101 MG/DL (ref 74–104)
HCT VFR BLD AUTO: 45.7 % (ref 42–52)
HDLC SERPL-MCNC: 41 MG/DL
HDLC SERPL: 5.1 {RATIO} (ref ?–5)
HGB UR QL STRIP: 14.7 G/DL (ref 14–18)
LDLC SERPL CALC-MCNC: 115 MG/DL
LDLC/HDLC SERPL: 2.8 {RATIO} (ref ?–3.6)
LYMPHOCYTES # SPEC AUTO: 2 10^3/UL (ref 1.5–3.5)
LYMPHOCYTES NFR BLD AUTO: 37.2 %
MCH RBC QN AUTO: 28.5 PG (ref 27–31)
MCHC RBC AUTO-ENTMCNC: 32.2 G/DL (ref 32–36)
MCV RBC AUTO: 88.7 FL (ref 80–94)
MONOCYTES # BLD AUTO: 0.5 10^3/UL (ref 0–1)
MONOCYTES NFR BLD AUTO: 9.1 %
NEUTROPHILS # BLD AUTO: 2.6 10^3/UL (ref 1.5–6.6)
NEUTROPHILS # SNV AUTO: 5.3 X10^3/UL (ref 4.8–10.8)
NEUTROPHILS NFR BLD AUTO: 48.9 %
NRBC # BLD AUTO: 0 /100WBC
NRBC # BLD AUTO: 0 X10^3/UL
PDW BLD AUTO: 9.7 FL (ref 7.4–11.4)
PLATELET # BLD: 218 10^3/UL (ref 130–450)
POTASSIUM SERPL-SCNC: 4.5 MMOL/L (ref 3.5–4.5)
PROT SPEC-MCNC: 6.6 G/DL (ref 6.4–8.9)
RBC MAR: 5.15 10^6/UL (ref 4.7–6.1)
SODIUM SERPLBLD-SCNC: 140 MMOL/L (ref 135–145)
TRIGL P FAST SERPL-MCNC: 258 MG/DL (ref 48–352)
TSH SERPL-ACNC: 2.58 UIU/ML (ref 0.34–5.6)
VLDLC SERPL-SCNC: 52 MG/DL

## 2023-11-21 PROCEDURE — 84443 ASSAY THYROID STIM HORMONE: CPT

## 2023-11-21 PROCEDURE — 85025 COMPLETE CBC W/AUTO DIFF WBC: CPT

## 2023-11-21 PROCEDURE — 80053 COMPREHEN METABOLIC PANEL: CPT

## 2023-11-21 PROCEDURE — 80061 LIPID PANEL: CPT

## 2023-11-21 PROCEDURE — 83721 ASSAY OF BLOOD LIPOPROTEIN: CPT

## 2023-11-21 PROCEDURE — 84153 ASSAY OF PSA TOTAL: CPT

## 2023-11-21 PROCEDURE — 84403 ASSAY OF TOTAL TESTOSTERONE: CPT

## 2023-11-21 PROCEDURE — 36415 COLL VENOUS BLD VENIPUNCTURE: CPT
